# Patient Record
Sex: MALE | Race: WHITE | NOT HISPANIC OR LATINO
[De-identification: names, ages, dates, MRNs, and addresses within clinical notes are randomized per-mention and may not be internally consistent; named-entity substitution may affect disease eponyms.]

---

## 2017-01-30 ENCOUNTER — APPOINTMENT (OUTPATIENT)
Dept: OTOLARYNGOLOGY | Facility: CLINIC | Age: 67
End: 2017-01-30

## 2018-07-30 ENCOUNTER — APPOINTMENT (OUTPATIENT)
Dept: COLORECTAL SURGERY | Facility: CLINIC | Age: 68
End: 2018-07-30
Payer: MEDICARE

## 2018-07-30 VITALS
HEART RATE: 55 BPM | DIASTOLIC BLOOD PRESSURE: 61 MMHG | WEIGHT: 135 LBS | BODY MASS INDEX: 22.77 KG/M2 | TEMPERATURE: 97.4 F | HEIGHT: 64.5 IN | SYSTOLIC BLOOD PRESSURE: 109 MMHG

## 2018-07-30 PROCEDURE — 99213 OFFICE O/P EST LOW 20 MIN: CPT | Mod: 25

## 2018-07-30 PROCEDURE — 46040 I&D ISCHIORCT&/PERIRCT ABSC: CPT

## 2018-08-06 ENCOUNTER — APPOINTMENT (OUTPATIENT)
Dept: COLORECTAL SURGERY | Facility: CLINIC | Age: 68
End: 2018-08-06
Payer: MEDICARE

## 2018-08-06 VITALS
BODY MASS INDEX: 23.08 KG/M2 | HEIGHT: 64 IN | HEART RATE: 55 BPM | DIASTOLIC BLOOD PRESSURE: 66 MMHG | TEMPERATURE: 97.4 F | SYSTOLIC BLOOD PRESSURE: 112 MMHG | WEIGHT: 135.19 LBS

## 2018-08-06 PROCEDURE — 99024 POSTOP FOLLOW-UP VISIT: CPT

## 2018-08-17 ENCOUNTER — MESSAGE (OUTPATIENT)
Age: 68
End: 2018-08-17

## 2018-08-17 ENCOUNTER — CLINICAL ADVICE (OUTPATIENT)
Age: 68
End: 2018-08-17

## 2018-09-24 ENCOUNTER — APPOINTMENT (OUTPATIENT)
Dept: COLORECTAL SURGERY | Facility: CLINIC | Age: 68
End: 2018-09-24
Payer: MEDICARE

## 2018-09-24 VITALS
HEIGHT: 64 IN | SYSTOLIC BLOOD PRESSURE: 115 MMHG | TEMPERATURE: 97.7 F | HEART RATE: 52 BPM | DIASTOLIC BLOOD PRESSURE: 69 MMHG | WEIGHT: 133 LBS | BODY MASS INDEX: 22.71 KG/M2

## 2018-09-24 DIAGNOSIS — K61.0 ANAL ABSCESS: ICD-10-CM

## 2018-09-24 PROCEDURE — 99024 POSTOP FOLLOW-UP VISIT: CPT

## 2020-12-21 ENCOUNTER — APPOINTMENT (OUTPATIENT)
Dept: COLORECTAL SURGERY | Facility: CLINIC | Age: 70
End: 2020-12-21

## 2021-02-26 ENCOUNTER — APPOINTMENT (OUTPATIENT)
Dept: COLORECTAL SURGERY | Facility: CLINIC | Age: 71
End: 2021-02-26
Payer: MEDICARE

## 2021-02-26 VITALS
BODY MASS INDEX: 22.71 KG/M2 | HEIGHT: 64 IN | HEART RATE: 53 BPM | SYSTOLIC BLOOD PRESSURE: 101 MMHG | WEIGHT: 133 LBS | TEMPERATURE: 98.2 F | DIASTOLIC BLOOD PRESSURE: 42 MMHG

## 2021-02-26 DIAGNOSIS — Z80.0 FAMILY HISTORY OF MALIGNANT NEOPLASM OF DIGESTIVE ORGANS: ICD-10-CM

## 2021-02-26 DIAGNOSIS — Z78.9 OTHER SPECIFIED HEALTH STATUS: ICD-10-CM

## 2021-02-26 DIAGNOSIS — Z80.6 FAMILY HISTORY OF LEUKEMIA: ICD-10-CM

## 2021-02-26 DIAGNOSIS — E78.00 PURE HYPERCHOLESTEROLEMIA, UNSPECIFIED: ICD-10-CM

## 2021-02-26 PROCEDURE — 46600 DIAGNOSTIC ANOSCOPY SPX: CPT

## 2021-02-26 PROCEDURE — 99214 OFFICE O/P EST MOD 30 MIN: CPT | Mod: 25

## 2021-02-26 RX ORDER — PSYLLIUM SEED
PACKET (EA) ORAL
Refills: 0 | Status: ACTIVE | COMMUNITY

## 2021-02-26 RX ORDER — TOPIRAMATE 50 MG/1
50 TABLET, COATED ORAL
Refills: 0 | Status: ACTIVE | COMMUNITY

## 2021-02-26 RX ORDER — OMEPRAZOLE 40 MG/1
40 CAPSULE, DELAYED RELEASE ORAL
Refills: 0 | Status: ACTIVE | COMMUNITY

## 2021-02-26 RX ORDER — AMOXICILLIN AND CLAVULANATE POTASSIUM 875; 125 MG/1; MG/1
875-125 TABLET, COATED ORAL
Qty: 14 | Refills: 0 | Status: DISCONTINUED | COMMUNITY
Start: 2018-08-17 | End: 2021-02-26

## 2021-02-26 RX ORDER — CHOLECALCIFEROL (VITAMIN D3) 25 MCG
25 TABLET ORAL
Refills: 0 | Status: ACTIVE | COMMUNITY

## 2021-02-26 RX ORDER — SIMVASTATIN 40 MG/1
40 TABLET, FILM COATED ORAL
Refills: 0 | Status: ACTIVE | COMMUNITY

## 2021-02-26 RX ORDER — ACETAMINOPHEN/DIPHENHYDRAMINE 500MG-25MG
1000 TABLET ORAL
Refills: 0 | Status: ACTIVE | COMMUNITY

## 2021-02-26 RX ORDER — POTASSIUM CHLORIDE 10 MEQ
10 CAPSULE, EXTENDED RELEASE ORAL
Refills: 0 | Status: ACTIVE | COMMUNITY

## 2021-02-26 RX ORDER — LEVOTHYROXINE SODIUM 112 UG/1
112 TABLET ORAL
Refills: 0 | Status: ACTIVE | COMMUNITY

## 2021-02-26 RX ORDER — METOPROLOL TARTRATE 75 MG/1
TABLET, FILM COATED ORAL
Refills: 0 | Status: ACTIVE | COMMUNITY

## 2021-02-26 NOTE — ASSESSMENT
[FreeTextEntry1] : I reviewed with the patient and his findings are likely consistent with a variant of anal spasm/levator syndrome. I recommended a trial of pelvic floor physical therapy. If symptoms are persistent would consider further evaluation including MRI and the potential role of r antispasmodic medications were reviewed.

## 2021-02-26 NOTE — PHYSICAL EXAM
[Excoriation] : no perianal excoriation [Fistula] : a fistula [Normal] : was normal [Posterior] : posteriorly [None] : there was no rectal mass  [de-identified] : quiescent  right posterior margin fistula. no induration or fluctuance.  non tender [FreeTextEntry1] : A lighted anoscope was passed into the anal canal and the entire anal mucosal surface was inspected..  The findings revealed moderate internal hemorrhoids. No masses or lesions were identified.\par \par

## 2021-02-26 NOTE — HISTORY OF PRESENT ILLNESS
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 He has deferred surgical treatment in the past. \par H/o office I&D 7/30/2018 to R posterior anal margin\par \par Last seen 9/2018 w/ scarring at right posterior anal margin. no fistula opening on exam\par \par MRI pelvis 9/26/18 at [facility]:\par - R sided perianal fistula begins at 7 o'clock and extends posteriorly to R medial buttock. Appears slightly smaller in comparison to prior imaging (9/2015)\par \par C/o intermittent anorectal pain for several weeks that improves with walking and sitz baths\par H/o abdominal pain in the past, denies recently \par Denies rectal bleeding, drainage, fever, chills, odor, change in appetite or energy \par Denies h/o low back or pelvis\par BH: daily to every other day\par Denies constipation, diarrhea or straining. C/o FU but denies FI\par Reports adequate dietary fiber intake. Drinking 3 bottles of water day and several cups of tea\par Evaluated by urologist yesterday who did not feel hemorrhoids or enlargement of prostate, prescribed dicyclomine which has helped. Discussed with patient this may be an anal spasm and IBS\par Scheduled to f/u with GI, Dr. Fox Barbosa ([facility]) as well\par Last coloscopy completed 2015 \par

## 2021-03-23 ENCOUNTER — NON-APPOINTMENT (OUTPATIENT)
Age: 71
End: 2021-03-23

## 2021-04-12 ENCOUNTER — APPOINTMENT (OUTPATIENT)
Dept: COLORECTAL SURGERY | Facility: CLINIC | Age: 71
End: 2021-04-12
Payer: MEDICARE

## 2021-04-12 VITALS
TEMPERATURE: 97.9 F | BODY MASS INDEX: 22.53 KG/M2 | HEART RATE: 50 BPM | DIASTOLIC BLOOD PRESSURE: 54 MMHG | HEIGHT: 64 IN | SYSTOLIC BLOOD PRESSURE: 129 MMHG | WEIGHT: 132 LBS

## 2021-04-12 PROCEDURE — 99214 OFFICE O/P EST MOD 30 MIN: CPT

## 2021-04-12 NOTE — HISTORY OF PRESENT ILLNESS
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 He has deferred surgical treatment in the past. \par H/o office I&D 7/30/2018 to R posterior anal margin\par MRI pelvis 9/26/18 at [facility]:\par - R sided perianal fistula begins at 7 o'clock and extends posteriorly to R medial buttock. Appears slightly smaller in comparison to prior imaging (9/2015)\par \par Last seen 2/26 w/ noted rectal tenderness and advised to proceed w/ pelvic floor PT for anal spasm.\par Pt followed by UROL for overactive bladder and is currently on dicyclomine and advised to do biofeedback. Pt denies taking dicyclomine.\par Patient started PT 4 weeks ago and therapist comes into home twice a week. Pt was given rectal probe for biofeedback, however reported discomfort and has since discontinued its use. \par As per patient, therapist is focused on bladder and not anal pain and has also recommended food avoidance of certain bladder irritants (banana, avocado, etc). She also recommended use of Squatty Potty. Pt denies any improvement in bladder or rectal symptoms.\par Pt continues to urinate >12 times daily and wants Dr. Han's opinion regarding PT\par c/o intermittent rectal discomfort, level 3-4/10, slightly improved w/ soaking\par Denies rectal drainage, n/v/c/d\par BH: once daily to every other day, occasional slight straining\par Reports adequate dietary fiber and water intake. Takes Metamucil daily.\par Pt has since followed up w/ GI Dr. Fox Barbosa, not recommendations made regarding IBS or anal spasm\par Last colonoscopy 2015\par Denies ASA/NSAID

## 2021-04-12 NOTE — PHYSICAL EXAM
[Excoriation] : no perianal excoriation [Fistula] : a fistula [Normal] : was normal [Posterior] : posteriorly [None] : there was no rectal mass  [de-identified] : quiescent  right posterior margin fistula. no induration or fluctuance.  non tender

## 2021-04-12 NOTE — ASSESSMENT
[FreeTextEntry1] : I have recommended that the patient continue with the course of pelvic floor physical therapy to address his urinary symptoms and pelvic discomfort. In addition he will utilize his biofeedback probe as tolerated.

## 2021-12-10 ENCOUNTER — APPOINTMENT (OUTPATIENT)
Dept: COLORECTAL SURGERY | Facility: CLINIC | Age: 71
End: 2021-12-10
Payer: MEDICARE

## 2021-12-10 VITALS
HEART RATE: 51 BPM | HEIGHT: 64 IN | SYSTOLIC BLOOD PRESSURE: 126 MMHG | BODY MASS INDEX: 23.22 KG/M2 | WEIGHT: 136 LBS | TEMPERATURE: 98.2 F | DIASTOLIC BLOOD PRESSURE: 70 MMHG

## 2021-12-10 DIAGNOSIS — K59.4 ANAL SPASM: ICD-10-CM

## 2021-12-10 PROCEDURE — 99214 OFFICE O/P EST MOD 30 MIN: CPT | Mod: 25

## 2021-12-10 PROCEDURE — 46600 DIAGNOSTIC ANOSCOPY SPX: CPT

## 2021-12-10 NOTE — PHYSICAL EXAM
[Excoriation] : no perianal excoriation [Fistula] : a fistula [Normal] : was normal [None] : there was no rectal mass  [de-identified] : quiescent  right posterior margin fistula. no induration or fluctuance.  non tender [FreeTextEntry1] : Medical assistant was present for the entire exam.\par \par Anoscopy was performed for evaluation of the patients rectal bleeding  history .\par The risks, benefits and alternatives were reviewed.\par \par A lighted anoscope was passed into the anal canal and the entire anal mucosal surface was inspected..  \par The findings revealed moderate internal hemorrhoids.\par No masses or lesions were identified.\par \par

## 2021-12-10 NOTE — ASSESSMENT
[FreeTextEntry1] : I reviewed with the patient the findings on examination revealed no evidence of acute inflammation of his hemorrhoids or anal abscess/fistula exacerbation.  Therefore, I recommended that he proceed with a course of aggressive increase hydration and fiber intake.  He will start a course of lubricant suppositories.  If symptoms are persistent advised repeat MRI in 3 to 4 weeks.  All questions answered.

## 2021-12-10 NOTE — HISTORY OF PRESENT ILLNESS
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 He has deferred surgical treatment in the past. \par H/o office I&D 7/30/2018 to R posterior anal margin\par \par \par MRI pelvis completed 9/26/18 at [facility]\par - R sided perianal fistula begins at 7 o'clock and extends posteriorly to R medial buttock. Appears slightly smaller in comparison to prior imaging (9/2015)\par \par Last seen in the office on 4/12/21. A fistula and quiescent right posterior margin fistula. no induration or fluctuance. non tender, but no perianal excoriation. The sphincter tone was normal. There was rectal tenderness present posteriorly. Advised to continue pelvic floor PT and utilize biofeedback probe\par \par C/o new onset of intermittent aching anorectal pain for the past couple of weeks that is worse with prolonged sitting\par Denies rectal bleeding but admits to occasional itching without night time awakening\par Denies perianal swelling, bumps/lumps, fever, chills or odor \par Doing sitz baths TID with mild improvement in symptoms\par No longer doing pelvic PT because physical therapist requested patient make dietary changes and he states he cannot make changes. C/o overactive bladder\par No use of ointments or creams\par BH: Daily to every other day\par Admits to occasional straining. Denies prolonged sitting\par Reports occasional FU without FI \par C/o incomplete evacuation of bowels and difficulty with cleaning \par Taking Flax seed or Metamucil daily \par Reports adequate dietary fiber intake. Currently drinking 6-7 cups of water daily\par Pt has followed by Dr. Fox Mccauley, GI, no recommendations made regarding IBS or anal spasm\par Last colonoscopy completed 2015, incomplete 2/2 poor prep. Internal hemorrhoids noted\par No ASA/NSAIDs last 7 days

## 2022-06-30 ENCOUNTER — APPOINTMENT (OUTPATIENT)
Dept: OTOLARYNGOLOGY | Facility: CLINIC | Age: 72
End: 2022-06-30

## 2022-06-30 VITALS
SYSTOLIC BLOOD PRESSURE: 126 MMHG | DIASTOLIC BLOOD PRESSURE: 76 MMHG | TEMPERATURE: 98.4 F | WEIGHT: 132 LBS | BODY MASS INDEX: 22.26 KG/M2 | HEIGHT: 64.5 IN | HEART RATE: 56 BPM

## 2022-06-30 DIAGNOSIS — K21.9 GASTRO-ESOPHAGEAL REFLUX DISEASE W/OUT ESOPHAGITIS: ICD-10-CM

## 2022-06-30 DIAGNOSIS — J38.7 OTHER DISEASES OF LARYNX: ICD-10-CM

## 2022-06-30 DIAGNOSIS — R49.0 DYSPHONIA: ICD-10-CM

## 2022-06-30 DIAGNOSIS — R07.89 OTHER CHEST PAIN: ICD-10-CM

## 2022-06-30 PROCEDURE — 99205 OFFICE O/P NEW HI 60 MIN: CPT | Mod: 25

## 2022-06-30 PROCEDURE — 31579 LARYNGOSCOPY TELESCOPIC: CPT

## 2022-06-30 RX ORDER — FAMOTIDINE 20 MG/1
20 TABLET ORAL
Qty: 30 | Refills: 3 | Status: ACTIVE | COMMUNITY
Start: 2022-06-30 | End: 1900-01-01

## 2022-06-30 NOTE — HISTORY OF PRESENT ILLNESS
[de-identified] : 72-year-old gentleman who presents with multiple ENT issues.  Patient states that over 2 months ago while gardening he was exposed to a "weed killer.".  Subsequently he had several symptoms including tightness in the neck, burning lips and burning eyes as well as chest discomfort.  Most symptoms have resolved however he continues to have tightness and discomfort in his lower neck, upper chest.    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  He presents  for second opinion today.  Overall at this time he denies any issues chewing, eating, or swallowing.  No significant voice changes over the past few years.  He denies any significant breathing issues.    He does have a known diagnosis of Andujar's esophagus and has been on omeprazole for several years.  Hx of thyroid cancer, Previous hx of acoustic neuroma 1993 and 1999, \par Eye soaking gold plate, on right.\par \par He has a known history of  intermittent shortness of breath.   notes that his history of stress and anxiety may be playing a role in this as well.\par \par Reflux - omeprazole 40 qd, taking for several years, does have Barretts.  He does follow with GI \par + decaf, blueberries, onion,\par - chocolate, citrus, tomato, garlic, carbonated beverages, alcohol\par 8 glasses of water daily.\par \par

## 2022-06-30 NOTE — ASSESSMENT
[FreeTextEntry1] :  72-year-old gentleman who presents with multiple ear, nose, throat symptoms.  At this point his only main complaint is a lower neck/upper chest discomfort.  Based on history and physical exam I do believe that there may be a component of laryngopharyngeal reflux.  I am recommending dietary and behavioral modification as well as the use of omeprazole and famotidine.  We reviewed this at length.  Due to potential pulmonary issues I am recommending consultation with pulmonology and I would like PFTs to rule out any source of discomfort.    Should this be negative we will further focus on GI related issues.\par \par   Patient to follow-up in 3 months.  If symptoms or not improved will refer to GI for formal pH testing, and should this be negative we will discuss irritable esophagus.\par \par \par - Pulmonology consultation\par - Dietary and behavioral modification to reduce acid reflux, handout given\par - Omeprazole qd as well as Famotidine qhs\par - Voice hygiene, increase hydration, sips of water throughout the day, avoid throat clearing\par - fu 3 mo\par - consider SLP for presbylarynges, wants to hold off for now

## 2022-06-30 NOTE — REASON FOR VISIT
[Initial Consultation] : an initial consultation for [FreeTextEntry2] : dysphonia, Neck and chest discomfort

## 2022-06-30 NOTE — PHYSICAL EXAM
[FreeTextEntry1] :   Hoarse and raspy voice, reduced pitch  control, decreased range,  decreased projection [Midline] : trachea located in midline position [Normal] : no rashes

## 2022-08-01 ENCOUNTER — APPOINTMENT (OUTPATIENT)
Dept: PULMONOLOGY | Facility: CLINIC | Age: 72
End: 2022-08-01

## 2022-09-29 ENCOUNTER — APPOINTMENT (OUTPATIENT)
Dept: OTOLARYNGOLOGY | Facility: CLINIC | Age: 72
End: 2022-09-29

## 2023-04-04 ENCOUNTER — NON-APPOINTMENT (OUTPATIENT)
Age: 73
End: 2023-04-04

## 2023-04-06 ENCOUNTER — EMERGENCY (EMERGENCY)
Facility: HOSPITAL | Age: 73
LOS: 1 days | Discharge: HOME | End: 2023-04-06
Attending: STUDENT IN AN ORGANIZED HEALTH CARE EDUCATION/TRAINING PROGRAM | Admitting: STUDENT IN AN ORGANIZED HEALTH CARE EDUCATION/TRAINING PROGRAM
Payer: MEDICARE

## 2023-04-06 VITALS
HEART RATE: 55 BPM | TEMPERATURE: 98 F | SYSTOLIC BLOOD PRESSURE: 118 MMHG | HEIGHT: 64 IN | RESPIRATION RATE: 18 BRPM | OXYGEN SATURATION: 99 % | DIASTOLIC BLOOD PRESSURE: 63 MMHG | WEIGHT: 130.07 LBS

## 2023-04-06 DIAGNOSIS — K59.00 CONSTIPATION, UNSPECIFIED: ICD-10-CM

## 2023-04-06 DIAGNOSIS — Z91.040 LATEX ALLERGY STATUS: ICD-10-CM

## 2023-04-06 PROCEDURE — 85025 COMPLETE CBC W/AUTO DIFF WBC: CPT

## 2023-04-06 PROCEDURE — 83690 ASSAY OF LIPASE: CPT

## 2023-04-06 PROCEDURE — 80053 COMPREHEN METABOLIC PANEL: CPT

## 2023-04-06 PROCEDURE — 99284 EMERGENCY DEPT VISIT MOD MDM: CPT

## 2023-04-06 PROCEDURE — 36415 COLL VENOUS BLD VENIPUNCTURE: CPT

## 2023-04-06 PROCEDURE — 99283 EMERGENCY DEPT VISIT LOW MDM: CPT

## 2023-04-06 NOTE — ED PROVIDER NOTE - PROGRESS NOTE DETAILS
Pt with large BM after enema, feels better, labs unremarkable. Given reassurance, return precautions, PCP fu

## 2023-04-06 NOTE — ED PROVIDER NOTE - CLINICAL SUMMARY MEDICAL DECISION MAKING FREE TEXT BOX
72-year-old male presenting with constipation in the setting of low fiber diet, recent CT scan that showed constipation, abdomen soft and nontender, no abdominal pain, well-appearing, exam otherwise nonfocal, no risk factors for SBO, passing flatus.  Will give enema, patient has GI doctor that he will follow-up with, will give return precautions.

## 2023-04-06 NOTE — ED PROVIDER NOTE - PHYSICAL EXAMINATION
General: Well appearing, in no acute distress  HEENT: Normocephalic, atraumatic, extraocular movements intact  CV: Regular rate  Pulm: No respiratory distress, no tachypnea  Abd: Flat, no gross distension, soft, nontender, no stool on rectal exam  Ext: warm and well perfused  Skin: No gross rashes or lesions  Neuro: Alert and oriented, moving all extremities

## 2023-04-06 NOTE — ED PROVIDER NOTE - NSFOLLOWUPINSTRUCTIONS_ED_ALL_ED_FT
Constipation, Adult  Constipation is when a person has fewer than three bowel movements in a week, has difficulty having a bowel movement, or has stools (feces) that are dry, hard, or larger than normal. Constipation may be caused by an underlying condition. It may become worse with age if a person takes certain medicines and does not take in enough fluids.    Follow these instructions at home:  Eating and drinking      Eat foods that have a lot of fiber, such as beans, whole grains, and fresh fruits and vegetables.  Limit foods that are low in fiber and high in fat and processed sugars, such as fried or sweet foods. These include french fries, hamburgers, cookies, candies, and soda.  Drink enough fluid to keep your urine pale yellow.  General instructions    Exercise regularly or as told by your health care provider. Try to do 150 minutes of moderate exercise each week.  Use the bathroom when you have the urge to go. Do not hold it in.  Take over-the-counter and prescription medicines only as told by your health care provider. This includes any fiber supplements.  During bowel movements:  Practice deep breathing while relaxing the lower abdomen.  Practice pelvic floor relaxation.  Watch your condition for any changes. Let your health care provider know about them.  Keep all follow-up visits as told by your health care provider. This is important.  Contact a health care provider if:  You have pain that gets worse.  You have a fever.  You do not have a bowel movement after 4 days.  You vomit.  You are not hungry or you lose weight.  You are bleeding from the opening between the buttocks (anus).  You have thin, pencil-like stools.  Get help right away if:  You have a fever and your symptoms suddenly get worse.  You leak stool or have blood in your stool.  Your abdomen is bloated.  You have severe pain in your abdomen.  You feel dizzy or you faint.  Summary  Constipation is when a person has fewer than three bowel movements in a week, has difficulty having a bowel movement, or has stools (feces) that are dry, hard, or larger than normal.  Eat foods that have a lot of fiber, such as beans, whole grains, and fresh fruits and vegetables.  Drink enough fluid to keep your urine pale yellow.  Take over-the-counter and prescription medicines only as told by your health care provider. This includes any fiber supplements.  This information is not intended to replace advice given to you by your health care provider. Make sure you discuss any questions you have with your health care provider.

## 2023-04-06 NOTE — ED ADULT NURSE NOTE - CINV DISCH MEDS REVIEWED YN
This is a transition of care visit for patient was admitted to [facility] from April 13th to 15th due to chest pain  Initial troponins were negative  2 hour troponin showed spike with ST-elevation lateral leads  Patient subsequently had catheterization which showed 90% lesion in 1st diagonal   He was successfully stented and discharged on April 15th in stable condition on metoprolol 12 5 b i d , atorvastatin 80 mg daily, aspirin 81 mg daily and Brilinta 90 mg b i d  Overall he is feeling well  Denies any chest pain or shortness of breath  He was following up with his cardiologist tomorrow  Is also scheduled for cardiac rehab consult in near future  Exam today is unremarkable  Recommend continue current meds  No

## 2023-04-06 NOTE — ED ADULT NURSE NOTE - DISCHARGE DATE/TIME
06-Apr-2023 13:01 Slit Excision Additional Text (Leave Blank If You Do Not Want): A linear line was drawn on the skin overlying the lesion. An incision was made slowly until the lesion was visualized.  Once visualized, the lesion was removed with blunt dissection.

## 2023-04-06 NOTE — ED ADULT NURSE NOTE - OBJECTIVE STATEMENT
patient presents to ED with constipation for weeks, small BM for everyday, patient is complaining of mild abdominal pain w/o n&v, fever, cough, cp, sob. A&OX4. stable at bed.

## 2023-04-06 NOTE — ED ADULT TRIAGE NOTE - CHIEF COMPLAINT QUOTE
Pt presents to ED c/o constipation x "weeks". been trying laxatives, reports small BMs every few days. +abdominal pain and bloating. denies fever, cp, sob.

## 2023-04-06 NOTE — ED PROVIDER NOTE - NS ED ROS FT
Constitutional: No fever or chills  Eyes: No discharge or drainage  Ears, Nose, Mouth, Throat: No nasal discharge, no sore throat  Cardiovascular: No chest pain, no palpitations  Respiratory: No shortness of breath, no cough  Gastrointestinal: No nausea or vomiting, no abdominal pain, no diarrhea, +  constipation  Musculoskeletal: No joint pain, no swelling  Skin: No rashes or lesions  Neurological: No numbness, weakness, tingling, no headache

## 2023-04-06 NOTE — ED PROVIDER NOTE - PATIENT PORTAL LINK FT
You can access the FollowMyHealth Patient Portal offered by [facility] by registering at the following website: [email or web address]. By joining [employer]s FollowMyHealth portal, you will also be able to view your health information using other applications (apps) compatible with our system.

## 2023-04-06 NOTE — ED PROVIDER NOTE - OBJECTIVE STATEMENT
72-year-old male no significant past medical history presenting with several weeks of intermittent constipation.  Patient states he thought he had diverticulitis 3 weeks prior, was eating a low fiber diet.  Patient states he has been constipated over the last 2 weeks, having bowel movements every day but small, firm without fever, chills, nausea, vomiting, abdominal pain.  No prior abdominal surgeries, passing flatus.  No urinary complaints.  Patient has tried Aleve.    Patient had CT scan done on 3/30 that showed constipation but otherwise no acute GI findings. ROS as above.

## 2023-04-13 ENCOUNTER — NON-APPOINTMENT (OUTPATIENT)
Age: 73
End: 2023-04-13

## 2023-04-14 ENCOUNTER — APPOINTMENT (OUTPATIENT)
Dept: COLORECTAL SURGERY | Facility: CLINIC | Age: 73
End: 2023-04-14
Payer: MEDICARE

## 2023-04-14 ENCOUNTER — NON-APPOINTMENT (OUTPATIENT)
Age: 73
End: 2023-04-14

## 2023-04-14 VITALS
SYSTOLIC BLOOD PRESSURE: 102 MMHG | WEIGHT: 132 LBS | BODY MASS INDEX: 22.26 KG/M2 | TEMPERATURE: 98.7 F | DIASTOLIC BLOOD PRESSURE: 48 MMHG | HEART RATE: 64 BPM | HEIGHT: 64.5 IN

## 2023-04-14 DIAGNOSIS — R10.9 UNSPECIFIED ABDOMINAL PAIN: ICD-10-CM

## 2023-04-14 PROCEDURE — 99214 OFFICE O/P EST MOD 30 MIN: CPT

## 2023-04-14 NOTE — ASSESSMENT
[FreeTextEntry1] : Recommend GI consultation for surveillance colonoscopy as well as further evaluations of change in caliber of stools and abdominal pain given no clinical significant findings of intra-abdominal pathology on recent CT scan.  Advise urology consultation for hydronephrosis.

## 2023-04-14 NOTE — HISTORY OF PRESENT ILLNESS
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 Patient deferred surgical treatment in the past. \par H/o office I &D 7/30/18 to R posterior anal margin\par \par Last seen in the office 12/10/21, c/o onset of intermittent aching anorectal pain for the past couple of weeks that is worse with prolonged sitting. On exam, no anal fissures seen, perianal skin a fistula and quiescent right posterior margin fistula appreciated with no induration or fluctuance, non-tender. Sphincter tone was normal. Anoscopy findings revealed moderate internal hemorrhoids. \par \par Patient called office, spoke to GAYATHRI Gomez on 4/1/23 to update on status.  Reports constipation and is followed by GI Dr. Mccauley who had advised Miralax daily and if not improved to present to ER for further management. Patient took Miralax this past weekend to avoid going to the ER and had several BMs. Now only passing small quantities of stool. Denies abd pain or nausea/vomiting. Pt continues to eat large quantities of food. Pt reports imaging ordered by GI and noted fecal load as well as "something with "my kidneys," per pt, GI has referred to UROL and Nephro for further evaluation. Advised if constipation does not improve w/Miralax pt to present to ER. Pt states he isn't ready to do so, advised to follow GI advice. \par \par \par Prior imaging: \par MRI pelvis completed 9/26/18 at [facility]\par - R sided perianal fistula begins at 7 o'clock and extends posteriorly to R medial buttock. Appears slightly smaller in comparison to prior imaging (9/2015)\par \par

## 2023-04-14 NOTE — PHYSICAL EXAM
[Excoriation] : no perianal excoriation [Fistula] : a fistula [Normal] : was normal [None] : there was no rectal mass  [de-identified] : quiescent  right posterior margin fistula. no induration or fluctuance.  non tender

## 2023-04-18 ENCOUNTER — APPOINTMENT (OUTPATIENT)
Dept: UROLOGY | Facility: CLINIC | Age: 73
End: 2023-04-18

## 2023-04-18 NOTE — HISTORY OF PRESENT ILLNESS
[FreeTextEntry1] : Name IRON QUEVEDO \par MRN [id number] \par  1950 \par ------------------------------------------------------------------------------------------------------------------------------------------- \par Date of First Visit: 23\par Referring Provider/PCP: Alen Han MD / Andrew Vargas MD\par ------------------------------------------------------------------------------------------------------------------------------------------- \par CC: hydronephrosis\par \par History of Present Illness: IRON QUEVEDO is a 73 year old male PMH HLD, recurrent perirectal abscesses, nephrolithiasis s/p URS/LL , BPH s/p PVP , right IHR  who presents for evaluation of hydronephrosis detected on recent CT, performed for abdominal pain.\par \par Imaging: CT WO scan from 3/29/23 (report only - from [facility]). Findings: Right kidney is normal size and position with a 1 mm nonobstructing stone in the upper pole.  Left kidney is mildly enlarged with mild hydronephrosis and possible contribution from some parapelvic cysts.  There are multiple calcified calculi.  The largest in the lower pole measures up to 6 mm of mean density 1090 Hounsfield units.  Tandem 2 mm stones each upper pole, 2 mm lateral mid to upper pole, and 2 mm lower pole adjacent to the dominant calculus.  The largest of a few cyst is in the upper pole measuring 3.7 cm x 2.8 cm.  The thin septation seen on recent ultrasound on his upper pole cyst and smaller lower pole cyst or not able to be identified given absence of IV contrast.  Ureters are not dilated bilaterally."\par \par Previous urine cultures:  \par \par Kidney Stone History:  \par First-time stone former - ***** \par Concurrent asymptomatic stone(s) - ***** \par Previous stone surgeries - ***** \par Previous passed stones - ***** \par Comorbidities – obesity, diabetes, gout, IBD, hyperparathyroidism ***** non-contributory \par Family history of kidney stones - ***** \par Previous metabolic evaluation - ***** \par Previous 24-hour urine findings - *****

## 2023-04-21 ENCOUNTER — APPOINTMENT (OUTPATIENT)
Dept: COLORECTAL SURGERY | Facility: CLINIC | Age: 73
End: 2023-04-21
Payer: MEDICARE

## 2023-04-21 VITALS
DIASTOLIC BLOOD PRESSURE: 67 MMHG | HEIGHT: 64.5 IN | WEIGHT: 130 LBS | TEMPERATURE: 98.7 F | HEART RATE: 64 BPM | SYSTOLIC BLOOD PRESSURE: 95 MMHG | BODY MASS INDEX: 21.93 KG/M2

## 2023-04-21 PROCEDURE — 99213 OFFICE O/P EST LOW 20 MIN: CPT | Mod: 25

## 2023-04-21 PROCEDURE — 46600 DIAGNOSTIC ANOSCOPY SPX: CPT

## 2023-04-21 NOTE — ASSESSMENT
[FreeTextEntry1] : Advised conservative management for the treatment of his incomplete evacuation with increasing fiber supplementation and hydration.  Appropriate guidelines reviewed.\par \par Advised trial of hydrocortisone suppository for internal anal canal itching.\par \par Appropriate perianal hygiene instructions outlined.  All questions answered.\par \par Recommend follow-up with urology and GI/colonoscopy.

## 2023-04-21 NOTE — HISTORY OF PRESENT ILLNESS
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 Patient deferred surgical treatment in the past. \par H/o office I &D 7/30/18 to R posterior anal margin\par \par Seen in the office 12/10/21, c/o onset of intermittent aching anorectal pain for the past couple of weeks that is worse with prolonged sitting. On exam, no anal fissures seen, perianal skin a fistula and quiescent right posterior margin fistula appreciated with no induration or fluctuance, non-tender. Sphincter tone was normal. Anoscopy findings revealed moderate internal hemorrhoids. \par \par Patient called office, spoke to GAYATHRI Gomez on 4/1/23 to update on status. Reports constipation and is followed by GI Dr. Mccauley who had advised Miralax daily and if not improved to present to ER for further management. Patient took Miralax this past weekend to avoid going to the ER and had several BMs. Now only passing small quantities of stool. Denies abd pain or nausea/vomiting. Pt continues to eat large quantities of food. Pt reports imaging ordered by GI and noted fecal load as well as "something with "my kidneys," per pt, GI has referred to UROL and Nephro for further evaluation. Advised if constipation does not improve w/Miralax pt to present to ER. Pt states he isn't ready to do so, advised to follow GI advice. \par \par Most recently seen in follow up 4/14/23, no anal fissures seen. Fistula and quiescent right posterior margin fistula, no induration or fluctuance. Non tender, no perianal excoriation. Sphincter tone was normal. No rectal tenderness present. Pt recommended GI consultation for surveillance colonoscopy. Advised urology consultation for hydronephrosis. \par \par \par Patient presents today with complaints of itchiness of the anal canal.  No bleeding.  No significant pain.  Reports intermittent feelings of incomplete defecation which requires excessive cleaning.  Symptoms of pruritus are internal.  No symptoms of anal margin discomfort.

## 2023-04-21 NOTE — PHYSICAL EXAM
[Abdomen Masses] : No abdominal masses [Abdomen Tenderness] : ~T No ~M abdominal tenderness [No HSM] : no hepatosplenomegaly [Excoriation] : no perianal excoriation [Fistula] : a fistula [Normal] : was normal [None] : there was no rectal mass  [de-identified] : quiescent  right posterior margin fistula. no induration or fluctuance.  non tender [FreeTextEntry1] : Medical assistant was present for the entire exam.\par \par Anoscopy was performed for evaluation of the patients rectal bleeding  history .\par The risks, benefits and alternatives were reviewed.\par \par A lighted anoscope was passed into the anal canal and the entire anal mucosal surface was inspected..  \par The findings revealed moderate internal hemorrhoids.\par No masses or lesions were identified.\par \par

## 2023-04-24 RX ORDER — HYDROCORTISONE 25 MG/G
2.5 CREAM TOPICAL
Qty: 1 | Refills: 2 | Status: ACTIVE | COMMUNITY
Start: 2023-04-24 | End: 1900-01-01

## 2023-04-24 RX ORDER — HYDROCORTISONE ACETATE 25 MG/1
25 SUPPOSITORY RECTAL TWICE DAILY
Qty: 40 | Refills: 0 | Status: DISCONTINUED | COMMUNITY
Start: 2023-04-21 | End: 2023-04-24

## 2023-12-29 ENCOUNTER — APPOINTMENT (OUTPATIENT)
Dept: COLORECTAL SURGERY | Facility: CLINIC | Age: 73
End: 2023-12-29
Payer: MEDICARE

## 2023-12-29 VITALS
HEART RATE: 48 BPM | HEIGHT: 64.5 IN | TEMPERATURE: 98.1 F | DIASTOLIC BLOOD PRESSURE: 71 MMHG | BODY MASS INDEX: 23.1 KG/M2 | WEIGHT: 137 LBS | SYSTOLIC BLOOD PRESSURE: 124 MMHG

## 2023-12-29 DIAGNOSIS — K64.8 OTHER HEMORRHOIDS: ICD-10-CM

## 2023-12-29 PROCEDURE — 99212 OFFICE O/P EST SF 10 MIN: CPT | Mod: 25

## 2023-12-29 PROCEDURE — 46600 DIAGNOSTIC ANOSCOPY SPX: CPT

## 2023-12-29 NOTE — HISTORY OF PRESENT ILLNESS
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 Patient deferred surgical treatment in the past.  H/o office I &D 18 to R posterior anal margin   Seen in the office 12/10/21, c/o onset of intermittent aching anorectal pain for the past couple of weeks that is worse with prolonged sitting. On exam, no anal fissures seen, perianal skin a fistula and quiescent right posterior margin fistula appreciated with no induration or fluctuance, non-tender. Sphincter tone was normal. Anoscopy findings revealed moderate internal hemorrhoids.  Of note as per patient Imaging ordered by GI and noted large fecal load and "somethig in kidneys"  GI referred patient to Urology and Nephro.   Seen in follow up 23, no anal fissures seen. Fistula and quiescent right posterior margin fistula, no induration or fluctuance. Non tender, no perianal excoriation. Sphincter tone was normal. No rectal tenderness present. Pt recommended GI consultation for surveillance colonoscopy. Advised urology consultation for hydronephrosis.  Last seen 2023 c/o anal itching and incomplete evacuation. Exam notable for a fistula and quiescent right posterior margin fistula. Moderate internal hemorrhoids. Advised conservative treatment increasing fiber and supplementation/hydration. Advised on trial of Hydrocortisone suppository for anal itching. F/u w urology and GI for colonoscopy.   Patient presents for f/u, states has a bump/external tissue that is always out and prevents him from cleaning properly. States he has to use a lot of TP to fully clean area. States also reports constant itching of anal area. Has been using Calmol 4 suppositories intermittently states he think it . Has not been using Hydrocortisone which was Rxed at last visit.   BM: 1x a day, soft, fully formed. Takes flax seed every morning. Occasional straining.

## 2023-12-29 NOTE — PHYSICAL EXAM
[Abdomen Masses] : No abdominal masses [Abdomen Tenderness] : ~T No ~M abdominal tenderness [No HSM] : no hepatosplenomegaly [Excoriation] : no perianal excoriation [Fistula] : a fistula [Normal] : was normal [None] : there was no rectal mass  [de-identified] : quiescent  right posterior margin fistula. no induration or fluctuance.  non tender [FreeTextEntry1] : Medical assistant was present for the entire exam.\par  \par  Anoscopy was performed for evaluation of the patients rectal bleeding  history .\par  The risks, benefits and alternatives were reviewed.\par  \par  A lighted anoscope was passed into the anal canal and the entire anal mucosal surface was inspected..  \par  The findings revealed moderate internal hemorrhoids.\par  No masses or lesions were identified.\par  \par

## 2023-12-29 NOTE — ASSESSMENT
[FreeTextEntry1] :  Advised Aquaphor for topical irritation.  Utilization of lubricant suppositories for internal irritation as well.

## 2024-06-21 ENCOUNTER — RX RENEWAL (OUTPATIENT)
Age: 74
End: 2024-06-21

## 2024-06-21 RX ORDER — HYDROCORTISONE 25 MG/G
2.5 CREAM TOPICAL TWICE DAILY
Qty: 30 | Refills: 0 | Status: ACTIVE | COMMUNITY
Start: 2024-01-02 | End: 1900-01-01

## 2024-11-07 ENCOUNTER — APPOINTMENT (OUTPATIENT)
Dept: OPHTHALMOLOGY | Facility: CLINIC | Age: 74
End: 2024-11-07

## 2024-11-19 ENCOUNTER — APPOINTMENT (OUTPATIENT)
Dept: OPHTHALMOLOGY | Facility: CLINIC | Age: 74
End: 2024-11-19
Payer: MEDICARE

## 2024-11-19 ENCOUNTER — NON-APPOINTMENT (OUTPATIENT)
Age: 74
End: 2024-11-19

## 2024-11-19 PROCEDURE — 92014 COMPRE OPH EXAM EST PT 1/>: CPT

## 2024-11-19 PROCEDURE — 92250 FUNDUS PHOTOGRAPHY W/I&R: CPT

## 2024-11-19 PROCEDURE — 92020 GONIOSCOPY: CPT

## 2024-11-19 PROCEDURE — 92004 COMPRE OPH EXAM NEW PT 1/>: CPT

## 2024-11-19 PROCEDURE — 92083 EXTENDED VISUAL FIELD XM: CPT

## 2025-04-24 ENCOUNTER — RESULT REVIEW (OUTPATIENT)
Age: 75
End: 2025-04-24

## 2025-04-25 ENCOUNTER — APPOINTMENT (OUTPATIENT)
Dept: PULMONOLOGY | Facility: CLINIC | Age: 75
End: 2025-04-25
Payer: MEDICARE

## 2025-04-25 ENCOUNTER — RESULT REVIEW (OUTPATIENT)
Age: 75
End: 2025-04-25

## 2025-04-25 VITALS
HEART RATE: 69 BPM | HEIGHT: 64 IN | WEIGHT: 135 LBS | DIASTOLIC BLOOD PRESSURE: 80 MMHG | SYSTOLIC BLOOD PRESSURE: 120 MMHG | OXYGEN SATURATION: 100 % | BODY MASS INDEX: 23.05 KG/M2

## 2025-04-25 PROCEDURE — 99204 OFFICE O/P NEW MOD 45 MIN: CPT

## 2025-04-26 ENCOUNTER — TRANSCRIPTION ENCOUNTER (OUTPATIENT)
Age: 75
End: 2025-04-26

## 2025-04-28 ENCOUNTER — RESULT REVIEW (OUTPATIENT)
Age: 75
End: 2025-04-28

## 2025-04-28 DIAGNOSIS — J93.9 PNEUMOTHORAX, UNSPECIFIED: ICD-10-CM

## 2025-05-01 DIAGNOSIS — S36.112D: ICD-10-CM

## 2025-05-09 ENCOUNTER — RESULT REVIEW (OUTPATIENT)
Age: 75
End: 2025-05-09

## 2025-05-19 ENCOUNTER — APPOINTMENT (OUTPATIENT)
Dept: ORTHOPEDIC SURGERY | Facility: CLINIC | Age: 75
End: 2025-05-19

## 2025-06-16 ENCOUNTER — APPOINTMENT (OUTPATIENT)
Dept: OTOLARYNGOLOGY | Facility: CLINIC | Age: 75
End: 2025-06-16
Payer: MEDICARE

## 2025-06-16 VITALS
BODY MASS INDEX: 23.05 KG/M2 | SYSTOLIC BLOOD PRESSURE: 128 MMHG | DIASTOLIC BLOOD PRESSURE: 73 MMHG | HEART RATE: 53 BPM | HEIGHT: 64 IN | WEIGHT: 135 LBS

## 2025-06-16 PROBLEM — M26.609 TMJ (TEMPOROMANDIBULAR JOINT DISORDER): Status: ACTIVE | Noted: 2025-06-16

## 2025-06-16 PROBLEM — H91.93 BILATERAL HEARING LOSS, UNSPECIFIED HEARING LOSS TYPE: Status: ACTIVE | Noted: 2025-06-16

## 2025-06-16 PROCEDURE — 99214 OFFICE O/P EST MOD 30 MIN: CPT

## 2025-06-16 PROCEDURE — 92504 EAR MICROSCOPY EXAMINATION: CPT

## 2025-07-28 ENCOUNTER — APPOINTMENT (OUTPATIENT)
Dept: PULMONOLOGY | Facility: CLINIC | Age: 75
End: 2025-07-28

## 2025-08-26 ENCOUNTER — NON-APPOINTMENT (OUTPATIENT)
Age: 75
End: 2025-08-26

## 2025-08-26 ENCOUNTER — APPOINTMENT (OUTPATIENT)
Dept: OPHTHALMOLOGY | Facility: CLINIC | Age: 75
End: 2025-08-26
Payer: MEDICARE

## 2025-08-26 PROCEDURE — 92083 EXTENDED VISUAL FIELD XM: CPT

## 2025-08-26 PROCEDURE — 92134 CPTRZ OPH DX IMG PST SGM RTA: CPT

## 2025-08-26 PROCEDURE — 92201 OPSCPY EXTND RTA DRAW UNI/BI: CPT

## 2025-08-26 PROCEDURE — 92014 COMPRE OPH EXAM EST PT 1/>: CPT
